# Patient Record
Sex: FEMALE | Race: WHITE | NOT HISPANIC OR LATINO | Employment: OTHER | URBAN - METROPOLITAN AREA
[De-identification: names, ages, dates, MRNs, and addresses within clinical notes are randomized per-mention and may not be internally consistent; named-entity substitution may affect disease eponyms.]

---

## 2018-08-03 ENCOUNTER — APPOINTMENT (OUTPATIENT)
Dept: RADIOLOGY | Facility: MEDICAL CENTER | Age: 83
End: 2018-08-03
Attending: EMERGENCY MEDICINE
Payer: MEDICARE

## 2018-08-03 ENCOUNTER — HOSPITAL ENCOUNTER (OUTPATIENT)
Facility: MEDICAL CENTER | Age: 83
End: 2018-08-04
Attending: EMERGENCY MEDICINE | Admitting: HOSPITALIST
Payer: MEDICARE

## 2018-08-03 DIAGNOSIS — S81.802D WOUND OF LEFT LOWER EXTREMITY, SUBSEQUENT ENCOUNTER: ICD-10-CM

## 2018-08-03 LAB
ABO GROUP BLD: NORMAL
ABO GROUP BLD: NORMAL
ALBUMIN SERPL BCP-MCNC: 3.9 G/DL (ref 3.2–4.9)
ALBUMIN/GLOB SERPL: 1.7 G/DL
ALP SERPL-CCNC: 86 U/L (ref 30–99)
ALT SERPL-CCNC: 19 U/L (ref 2–50)
ANION GAP SERPL CALC-SCNC: 5 MMOL/L (ref 0–11.9)
APPEARANCE UR: CLEAR
APTT PPP: 29.5 SEC (ref 24.7–36)
AST SERPL-CCNC: 27 U/L (ref 12–45)
BASOPHILS # BLD AUTO: 0.5 % (ref 0–1.8)
BASOPHILS # BLD: 0.03 K/UL (ref 0–0.12)
BILIRUB SERPL-MCNC: 0.7 MG/DL (ref 0.1–1.5)
BILIRUB UR QL STRIP.AUTO: NEGATIVE
BLD GP AB SCN SERPL QL: NORMAL
BUN SERPL-MCNC: 16 MG/DL (ref 8–22)
CALCIUM SERPL-MCNC: 8.9 MG/DL (ref 8.5–10.5)
CHLORIDE SERPL-SCNC: 106 MMOL/L (ref 96–112)
CHOLEST SERPL-MCNC: 138 MG/DL (ref 100–199)
CO2 SERPL-SCNC: 24 MMOL/L (ref 20–33)
COLOR UR: YELLOW
CREAT SERPL-MCNC: 0.74 MG/DL (ref 0.5–1.4)
EOSINOPHIL # BLD AUTO: 0.13 K/UL (ref 0–0.51)
EOSINOPHIL NFR BLD: 2 % (ref 0–6.9)
ERYTHROCYTE [DISTWIDTH] IN BLOOD BY AUTOMATED COUNT: 47.8 FL (ref 35.9–50)
GLOBULIN SER CALC-MCNC: 2.3 G/DL (ref 1.9–3.5)
GLUCOSE SERPL-MCNC: 72 MG/DL (ref 65–99)
GLUCOSE UR STRIP.AUTO-MCNC: NEGATIVE MG/DL
HCT VFR BLD AUTO: 40.1 % (ref 37–47)
HDLC SERPL-MCNC: 63 MG/DL
HGB BLD-MCNC: 13.5 G/DL (ref 12–16)
IMM GRANULOCYTES # BLD AUTO: 0.02 K/UL (ref 0–0.11)
IMM GRANULOCYTES NFR BLD AUTO: 0.3 % (ref 0–0.9)
INR PPP: 0.96 (ref 0.87–1.13)
KETONES UR STRIP.AUTO-MCNC: NEGATIVE MG/DL
LDLC SERPL CALC-MCNC: 60 MG/DL
LEUKOCYTE ESTERASE UR QL STRIP.AUTO: NEGATIVE
LYMPHOCYTES # BLD AUTO: 1.08 K/UL (ref 1–4.8)
LYMPHOCYTES NFR BLD: 16.8 % (ref 22–41)
MCH RBC QN AUTO: 29.9 PG (ref 27–33)
MCHC RBC AUTO-ENTMCNC: 33.7 G/DL (ref 33.6–35)
MCV RBC AUTO: 88.7 FL (ref 81.4–97.8)
MICRO URNS: NORMAL
MONOCYTES # BLD AUTO: 0.6 K/UL (ref 0–0.85)
MONOCYTES NFR BLD AUTO: 9.3 % (ref 0–13.4)
NEUTROPHILS # BLD AUTO: 4.57 K/UL (ref 2–7.15)
NEUTROPHILS NFR BLD: 71.1 % (ref 44–72)
NITRITE UR QL STRIP.AUTO: NEGATIVE
NRBC # BLD AUTO: 0 K/UL
NRBC BLD-RTO: 0 /100 WBC
PH UR STRIP.AUTO: 7 [PH]
PLATELET # BLD AUTO: 208 K/UL (ref 164–446)
PMV BLD AUTO: 11.7 FL (ref 9–12.9)
POTASSIUM SERPL-SCNC: 4.1 MMOL/L (ref 3.6–5.5)
PROT SERPL-MCNC: 6.2 G/DL (ref 6–8.2)
PROT UR QL STRIP: NEGATIVE MG/DL
PROTHROMBIN TIME: 12.5 SEC (ref 12–14.6)
RBC # BLD AUTO: 4.52 M/UL (ref 4.2–5.4)
RBC UR QL AUTO: NEGATIVE
RH BLD: NORMAL
RH BLD: NORMAL
SODIUM SERPL-SCNC: 135 MMOL/L (ref 135–145)
SP GR UR STRIP.AUTO: 1.01
TRIGL SERPL-MCNC: 75 MG/DL (ref 0–149)
TROPONIN I SERPL-MCNC: <0.01 NG/ML (ref 0–0.04)
UROBILINOGEN UR STRIP.AUTO-MCNC: 0.2 MG/DL
WBC # BLD AUTO: 6.4 K/UL (ref 4.8–10.8)

## 2018-08-03 PROCEDURE — G0378 HOSPITAL OBSERVATION PER HR: HCPCS

## 2018-08-03 PROCEDURE — 85730 THROMBOPLASTIN TIME PARTIAL: CPT

## 2018-08-03 PROCEDURE — 84484 ASSAY OF TROPONIN QUANT: CPT

## 2018-08-03 PROCEDURE — 86901 BLOOD TYPING SEROLOGIC RH(D): CPT

## 2018-08-03 PROCEDURE — 93005 ELECTROCARDIOGRAM TRACING: CPT | Performed by: EMERGENCY MEDICINE

## 2018-08-03 PROCEDURE — 99285 EMERGENCY DEPT VISIT HI MDM: CPT

## 2018-08-03 PROCEDURE — 86850 RBC ANTIBODY SCREEN: CPT

## 2018-08-03 PROCEDURE — 99220 PR INITIAL OBSERVATION CARE,LEVL III: CPT | Performed by: HOSPITALIST

## 2018-08-03 PROCEDURE — 81003 URINALYSIS AUTO W/O SCOPE: CPT

## 2018-08-03 PROCEDURE — 80061 LIPID PANEL: CPT

## 2018-08-03 PROCEDURE — 70496 CT ANGIOGRAPHY HEAD: CPT

## 2018-08-03 PROCEDURE — 86900 BLOOD TYPING SEROLOGIC ABO: CPT

## 2018-08-03 PROCEDURE — 700117 HCHG RX CONTRAST REV CODE 255: Performed by: EMERGENCY MEDICINE

## 2018-08-03 PROCEDURE — 71045 X-RAY EXAM CHEST 1 VIEW: CPT

## 2018-08-03 PROCEDURE — 700105 HCHG RX REV CODE 258: Performed by: HOSPITALIST

## 2018-08-03 PROCEDURE — 0042T CT-CEREBRAL PERFUSION ANALYSIS: CPT

## 2018-08-03 PROCEDURE — 70450 CT HEAD/BRAIN W/O DYE: CPT

## 2018-08-03 PROCEDURE — 83036 HEMOGLOBIN GLYCOSYLATED A1C: CPT

## 2018-08-03 PROCEDURE — 70498 CT ANGIOGRAPHY NECK: CPT

## 2018-08-03 PROCEDURE — 80053 COMPREHEN METABOLIC PANEL: CPT

## 2018-08-03 PROCEDURE — 85025 COMPLETE CBC W/AUTO DIFF WBC: CPT

## 2018-08-03 PROCEDURE — 85610 PROTHROMBIN TIME: CPT

## 2018-08-03 RX ORDER — CEPHALEXIN 500 MG/1
500 CAPSULE ORAL 3 TIMES DAILY
COMMUNITY
Start: 2018-08-03

## 2018-08-03 RX ORDER — BISACODYL 10 MG
10 SUPPOSITORY, RECTAL RECTAL
Status: DISCONTINUED | OUTPATIENT
Start: 2018-08-03 | End: 2018-08-05 | Stop reason: HOSPADM

## 2018-08-03 RX ORDER — CEPHALEXIN 500 MG/1
500 CAPSULE ORAL 3 TIMES DAILY
Status: DISCONTINUED | OUTPATIENT
Start: 2018-08-04 | End: 2018-08-05 | Stop reason: HOSPADM

## 2018-08-03 RX ORDER — CLOPIDOGREL BISULFATE 75 MG/1
75 TABLET ORAL DAILY
Status: DISCONTINUED | OUTPATIENT
Start: 2018-08-04 | End: 2018-08-05 | Stop reason: HOSPADM

## 2018-08-03 RX ORDER — VITAMIN E 268 MG
400 CAPSULE ORAL DAILY
COMMUNITY

## 2018-08-03 RX ORDER — METOPROLOL SUCCINATE 50 MG/1
75 TABLET, EXTENDED RELEASE ORAL DAILY
COMMUNITY

## 2018-08-03 RX ORDER — LEVOTHYROXINE SODIUM 88 UG/1
88 TABLET ORAL
COMMUNITY

## 2018-08-03 RX ORDER — AMOXICILLIN 250 MG
2 CAPSULE ORAL 2 TIMES DAILY
Status: DISCONTINUED | OUTPATIENT
Start: 2018-08-03 | End: 2018-08-05 | Stop reason: HOSPADM

## 2018-08-03 RX ORDER — POLYETHYLENE GLYCOL 3350 17 G/17G
1 POWDER, FOR SOLUTION ORAL
Status: DISCONTINUED | OUTPATIENT
Start: 2018-08-03 | End: 2018-08-05 | Stop reason: HOSPADM

## 2018-08-03 RX ORDER — LEVOTHYROXINE SODIUM 88 UG/1
88 TABLET ORAL
Status: DISCONTINUED | OUTPATIENT
Start: 2018-08-04 | End: 2018-08-05 | Stop reason: HOSPADM

## 2018-08-03 RX ORDER — SODIUM CHLORIDE 9 MG/ML
1000 INJECTION, SOLUTION INTRAVENOUS ONCE
Status: COMPLETED | OUTPATIENT
Start: 2018-08-03 | End: 2018-08-04

## 2018-08-03 RX ORDER — LOSARTAN POTASSIUM 50 MG/1
50 TABLET ORAL 2 TIMES DAILY
COMMUNITY

## 2018-08-03 RX ORDER — HYDRALAZINE HYDROCHLORIDE 20 MG/ML
10 INJECTION INTRAMUSCULAR; INTRAVENOUS
Status: DISCONTINUED | OUTPATIENT
Start: 2018-08-03 | End: 2018-08-04

## 2018-08-03 RX ORDER — SIMVASTATIN 10 MG
10 TABLET ORAL DAILY
COMMUNITY

## 2018-08-03 RX ORDER — CLOPIDOGREL BISULFATE 75 MG/1
75 TABLET ORAL DAILY
COMMUNITY

## 2018-08-03 RX ORDER — SIMVASTATIN 20 MG
40 TABLET ORAL DAILY
Status: DISCONTINUED | OUTPATIENT
Start: 2018-08-04 | End: 2018-08-04

## 2018-08-03 RX ORDER — LABETALOL HYDROCHLORIDE 5 MG/ML
10 INJECTION, SOLUTION INTRAVENOUS EVERY 4 HOURS PRN
Status: DISCONTINUED | OUTPATIENT
Start: 2018-08-03 | End: 2018-08-04

## 2018-08-03 RX ADMIN — IOHEXOL 120 ML: 350 INJECTION, SOLUTION INTRAVENOUS at 21:48

## 2018-08-03 RX ADMIN — SODIUM CHLORIDE 1000 ML: 9 INJECTION, SOLUTION INTRAVENOUS at 23:10

## 2018-08-03 ASSESSMENT — PAIN SCALES - GENERAL: PAINLEVEL_OUTOF10: 0

## 2018-08-04 ENCOUNTER — APPOINTMENT (OUTPATIENT)
Dept: RADIOLOGY | Facility: MEDICAL CENTER | Age: 83
End: 2018-08-04
Attending: HOSPITALIST
Payer: MEDICARE

## 2018-08-04 VITALS
RESPIRATION RATE: 20 BRPM | HEART RATE: 68 BPM | SYSTOLIC BLOOD PRESSURE: 170 MMHG | DIASTOLIC BLOOD PRESSURE: 79 MMHG | HEIGHT: 60 IN | BODY MASS INDEX: 26.19 KG/M2 | WEIGHT: 133.38 LBS | OXYGEN SATURATION: 97 % | TEMPERATURE: 97.9 F

## 2018-08-04 PROBLEM — R47.01 EXPRESSIVE APHASIA: Status: RESOLVED | Noted: 2018-08-04 | Resolved: 2018-08-04

## 2018-08-04 PROBLEM — R47.01 EXPRESSIVE APHASIA: Status: ACTIVE | Noted: 2018-08-04

## 2018-08-04 PROBLEM — E03.9 HYPOTHYROIDISM: Status: ACTIVE | Noted: 2018-08-04

## 2018-08-04 PROBLEM — G45.9 TIA (TRANSIENT ISCHEMIC ATTACK): Status: ACTIVE | Noted: 2018-08-04

## 2018-08-04 PROBLEM — I10 HTN (HYPERTENSION): Status: ACTIVE | Noted: 2018-08-04

## 2018-08-04 PROBLEM — S81.802A LEG WOUND, LEFT: Status: ACTIVE | Noted: 2018-08-04

## 2018-08-04 PROBLEM — E78.5 HLD (HYPERLIPIDEMIA): Status: ACTIVE | Noted: 2018-08-04

## 2018-08-04 LAB
EST. AVERAGE GLUCOSE BLD GHB EST-MCNC: 111 MG/DL
HBA1C MFR BLD: 5.5 % (ref 0–5.6)
LV EJECT FRACT  99904: 65
LV EJECT FRACT MOD 2C 99903: 60.63
LV EJECT FRACT MOD 4C 99902: 60.62
LV EJECT FRACT MOD BP 99901: 60.65
TSH SERPL DL<=0.005 MIU/L-ACNC: 2.65 UIU/ML (ref 0.38–5.33)

## 2018-08-04 PROCEDURE — 97161 PT EVAL LOW COMPLEX 20 MIN: CPT

## 2018-08-04 PROCEDURE — 97165 OT EVAL LOW COMPLEX 30 MIN: CPT

## 2018-08-04 PROCEDURE — 700102 HCHG RX REV CODE 250 W/ 637 OVERRIDE(OP): Performed by: NURSE PRACTITIONER

## 2018-08-04 PROCEDURE — G8989 SELF CARE D/C STATUS: HCPCS | Mod: CI

## 2018-08-04 PROCEDURE — 700102 HCHG RX REV CODE 250 W/ 637 OVERRIDE(OP): Performed by: INTERNAL MEDICINE

## 2018-08-04 PROCEDURE — 96105 ASSESSMENT OF APHASIA: CPT | Mod: XU

## 2018-08-04 PROCEDURE — G0378 HOSPITAL OBSERVATION PER HR: HCPCS

## 2018-08-04 PROCEDURE — 84443 ASSAY THYROID STIM HORMONE: CPT

## 2018-08-04 PROCEDURE — G9162 LANG EXPRESS CURRENT STATUS: HCPCS | Mod: CI

## 2018-08-04 PROCEDURE — G8988 SELF CARE GOAL STATUS: HCPCS | Mod: CI

## 2018-08-04 PROCEDURE — 93306 TTE W/DOPPLER COMPLETE: CPT

## 2018-08-04 PROCEDURE — 99217 PR OBSERVATION CARE DISCHARGE: CPT | Performed by: HOSPITALIST

## 2018-08-04 PROCEDURE — A9270 NON-COVERED ITEM OR SERVICE: HCPCS | Performed by: NURSE PRACTITIONER

## 2018-08-04 PROCEDURE — 700102 HCHG RX REV CODE 250 W/ 637 OVERRIDE(OP): Performed by: HOSPITALIST

## 2018-08-04 PROCEDURE — G8979 MOBILITY GOAL STATUS: HCPCS | Mod: CI

## 2018-08-04 PROCEDURE — 70551 MRI BRAIN STEM W/O DYE: CPT

## 2018-08-04 PROCEDURE — A9270 NON-COVERED ITEM OR SERVICE: HCPCS | Performed by: INTERNAL MEDICINE

## 2018-08-04 PROCEDURE — G8978 MOBILITY CURRENT STATUS: HCPCS | Mod: CJ

## 2018-08-04 PROCEDURE — A9270 NON-COVERED ITEM OR SERVICE: HCPCS | Performed by: HOSPITALIST

## 2018-08-04 PROCEDURE — G8987 SELF CARE CURRENT STATUS: HCPCS | Mod: CI

## 2018-08-04 PROCEDURE — G9163 LANG EXPRESS GOAL STATUS: HCPCS | Mod: CI

## 2018-08-04 PROCEDURE — G9164 LANG EXPRESS D/C STATUS: HCPCS | Mod: CI

## 2018-08-04 PROCEDURE — 93306 TTE W/DOPPLER COMPLETE: CPT | Mod: 26 | Performed by: INTERNAL MEDICINE

## 2018-08-04 RX ORDER — HYDRALAZINE HYDROCHLORIDE 20 MG/ML
10 INJECTION INTRAMUSCULAR; INTRAVENOUS
Status: DISCONTINUED | OUTPATIENT
Start: 2018-08-04 | End: 2018-08-05 | Stop reason: HOSPADM

## 2018-08-04 RX ORDER — LOPERAMIDE HYDROCHLORIDE 2 MG/1
2 CAPSULE ORAL ONCE
Status: COMPLETED | OUTPATIENT
Start: 2018-08-04 | End: 2018-08-04

## 2018-08-04 RX ORDER — SIMVASTATIN 20 MG
40 TABLET ORAL
Status: DISCONTINUED | OUTPATIENT
Start: 2018-08-04 | End: 2018-08-05 | Stop reason: HOSPADM

## 2018-08-04 RX ORDER — LOSARTAN POTASSIUM 50 MG/1
50 TABLET ORAL 2 TIMES DAILY
Status: DISCONTINUED | OUTPATIENT
Start: 2018-08-04 | End: 2018-08-05 | Stop reason: HOSPADM

## 2018-08-04 RX ORDER — LABETALOL HYDROCHLORIDE 5 MG/ML
10 INJECTION, SOLUTION INTRAVENOUS EVERY 4 HOURS PRN
Status: DISCONTINUED | OUTPATIENT
Start: 2018-08-04 | End: 2018-08-05 | Stop reason: HOSPADM

## 2018-08-04 RX ADMIN — ASPIRIN 81 MG: 81 TABLET, COATED ORAL at 06:01

## 2018-08-04 RX ADMIN — CLOPIDOGREL 75 MG: 75 TABLET, FILM COATED ORAL at 06:01

## 2018-08-04 RX ADMIN — CEPHALEXIN 500 MG: 500 CAPSULE ORAL at 18:33

## 2018-08-04 RX ADMIN — CEPHALEXIN 500 MG: 500 CAPSULE ORAL at 13:10

## 2018-08-04 RX ADMIN — LOPERAMIDE HYDROCHLORIDE 2 MG: 2 CAPSULE ORAL at 22:45

## 2018-08-04 RX ADMIN — CEPHALEXIN 500 MG: 500 CAPSULE ORAL at 06:01

## 2018-08-04 RX ADMIN — LEVOTHYROXINE SODIUM 88 MCG: 88 TABLET ORAL at 06:01

## 2018-08-04 RX ADMIN — METOPROLOL SUCCINATE 75 MG: 25 TABLET, EXTENDED RELEASE ORAL at 18:33

## 2018-08-04 RX ADMIN — LOSARTAN POTASSIUM 50 MG: 50 TABLET ORAL at 18:34

## 2018-08-04 RX ADMIN — SIMVASTATIN 40 MG: 20 TABLET, FILM COATED ORAL at 21:23

## 2018-08-04 ASSESSMENT — COGNITIVE AND FUNCTIONAL STATUS - GENERAL
SUGGESTED CMS G CODE MODIFIER DAILY ACTIVITY: CI
CLIMB 3 TO 5 STEPS WITH RAILING: A LITTLE
SUGGESTED CMS G CODE MODIFIER MOBILITY: CI
HELP NEEDED FOR BATHING: A LITTLE
MOBILITY SCORE: 23
DAILY ACTIVITIY SCORE: 23

## 2018-08-04 ASSESSMENT — ENCOUNTER SYMPTOMS
MYALGIAS: 0
TINGLING: 0
SPEECH CHANGE: 1
ABDOMINAL PAIN: 0
FEVER: 0
HEADACHES: 0
FOCAL WEAKNESS: 0
SORE THROAT: 0
NAUSEA: 0
DIZZINESS: 0
VOMITING: 0
DIARRHEA: 0
CHILLS: 0
PHOTOPHOBIA: 0
PALPITATIONS: 0
WHEEZING: 0
SHORTNESS OF BREATH: 0
DEPRESSION: 0
COUGH: 0

## 2018-08-04 ASSESSMENT — GAIT ASSESSMENTS
GAIT LEVEL OF ASSIST: SUPERVISED
DISTANCE (FEET): 85
DEVIATION: ANTALGIC;STEP TO;INCREASED BASE OF SUPPORT;DECREASED HEEL STRIKE;DECREASED TOE OFF
ASSISTIVE DEVICE: FRONT WHEEL WALKER

## 2018-08-04 ASSESSMENT — LIFESTYLE VARIABLES
EVER_SMOKED: NEVER
ALCOHOL_USE: NO

## 2018-08-04 ASSESSMENT — PATIENT HEALTH QUESTIONNAIRE - PHQ9
1. LITTLE INTEREST OR PLEASURE IN DOING THINGS: NOT AT ALL
SUM OF ALL RESPONSES TO PHQ9 QUESTIONS 1 AND 2: 0
2. FEELING DOWN, DEPRESSED, IRRITABLE, OR HOPELESS: NOT AT ALL

## 2018-08-04 ASSESSMENT — PAIN SCALES - GENERAL
PAINLEVEL_OUTOF10: 4
PAINLEVEL_OUTOF10: 0
PAINLEVEL_OUTOF10: 4

## 2018-08-04 ASSESSMENT — ACTIVITIES OF DAILY LIVING (ADL): TOILETING: INDEPENDENT

## 2018-08-04 NOTE — DISCHARGE SUMMARY
Discharge Summary    CHIEF COMPLAINT ON ADMISSION  No chief complaint on file.      Reason for Admission  Stroke     Admission Date  8/3/2018    CODE STATUS  Full Code    HPI & HOSPITAL COURSE  This is a 86 y.o. female here with TIA. Please see the previously dictated history of present illness 8/3/2018 for complete details. In short, the patient is from [facility] [place] when she noticed some worsening redness around her leg wound as well as slurred speech with mild expressive aphasia. The patient reports history of TIA in 2012 and has since been on aspirin and Plavix as well as a statin. She thus presented to the emergency room for further evaluation and treatment.    Initial EKG was unremarkable for acute pathology. Initial CT scan of the brain without contrast was negative for intracranial hemorrhage or mass. CTA head and neck was negative for aneurysm or large vessel occlusion. She was subsequently admitted to the observation unit for MRI and echocardiogram. MRI showed a single tiny punctate focus of T2 hyperintensity in the left paramedian cheryl which could represent a tiny old lacunar infarct. However there was no evidence of acute infarction in the brain stem, cerebellum or cerebral cortex. Echocardiogram revealed preserved ejection fraction at approximately 65% with no significant valvular or wall motion abnormalities.    Further evaluation of the left lower extremity leg wound shows slight basilio-laceration erythema consistent with inflammation but no active cellulitis or purulent drainage. Wound care was consulted and provided some dressing supplies. Patient was evaluated by PT and OT who felt she would benefit from continued treatment while an inpatient. However the patient appears well enough to be discharged and participate with an outpatient therapy program. She will be provided with a front wheeled walker per therapy recommendations to assist with painful  mobility.    The patient was counseled regarding the results of her tests and informed that she is clear for discharge. However the patient and her family representative expressed a desire to stay as they feel too exhausted and emotionally overwhelmed to drive back to the [facility] or find a hotel room. They were informed that staying an additional night in the hospital without being medical criteria could lead to responsibility for the bill. They expressed understanding of this liability but still would like to stay.    Therefore, she is discharged in good and stable condition to home with close outpatient follow-up.    Discharge Date  8/4/2018    FOLLOW UP ITEMS POST DISCHARGE  None    DISCHARGE DIAGNOSES  Principal Problem:    Expressive aphasia POA: Unknown  Active Problems:    HTN (hypertension) POA: Unknown    HLD (hyperlipidemia) POA: Unknown    Hypothyroidism POA: Unknown    Leg wound, left POA: Unknown  Resolved Problems:    * No resolved hospital problems. *      FOLLOW UP  No future appointments.  No follow-up provider specified.    MEDICATIONS ON DISCHARGE     Medication List      ASK your doctor about these medications      Instructions   acetaminophen-codeine #3 300-30 MG Tabs  Commonly known as:  TYLENOL #3   Take 1 Tab by mouth every four hours as needed.  Dose:  1 Tab     aspirin 81 MG tablet   Take 81 mg by mouth every day.  Dose:  81 mg     cephALEXin 500 MG Caps  Commonly known as:  KEFLEX   Take 500 mg by mouth 3 times a day. 7 day course, started 8/3/18  Dose:  500 mg     clopidogrel 75 MG Tabs  Commonly known as:  PLAVIX   Take 75 mg by mouth every day.  Dose:  75 mg     levothyroxine 88 MCG Tabs  Commonly known as:  SYNTHROID   Take 88 mcg by mouth Every morning on an empty stomach.  Dose:  88 mcg     losartan 50 MG Tabs  Commonly known as:  COZAAR   Take 50 mg by mouth 2 Times a Day.  Dose:  50 mg     metoprolol SR 50 MG Tb24  Commonly known as:  TOPROL XL   Take 75 mg by mouth every  day.  Dose:  75 mg     simvastatin 10 MG Tabs  Commonly known as:  ZOCOR   Take 10 mg by mouth every day.  Dose:  10 mg     vitamin e 400 UNIT Caps  Commonly known as:  VITAMIN E   Take 400 Units by mouth every day.  Dose:  400 Units            Allergies  No Known Allergies    DIET  Orders Placed This Encounter   Procedures   • Diet Order Regular     Standing Status:   Standing     Number of Occurrences:   1     Order Specific Question:   Diet:     Answer:   Regular [1]       ACTIVITY  As tolerated.  Weight bearing as tolerated    CONSULTATIONS  None    PROCEDURES  None    LABORATORY  Lab Results   Component Value Date    SODIUM 135 08/03/2018    POTASSIUM 4.1 08/03/2018    CHLORIDE 106 08/03/2018    CO2 24 08/03/2018    GLUCOSE 72 08/03/2018    BUN 16 08/03/2018    CREATININE 0.74 08/03/2018        Lab Results   Component Value Date    WBC 6.4 08/03/2018    HEMOGLOBIN 13.5 08/03/2018    HEMATOCRIT 40.1 08/03/2018    PLATELETCT 208 08/03/2018        Total time of the discharge process exceeds 40 minutes.

## 2018-08-04 NOTE — THERAPY
"Physical Therapy Evaluation completed.   Bed Mobility:  Supine to Sit: Supervised  Transfers: Sit to Stand: Supervised  Gait: Level Of Assist: Supervised with Front-Wheel Walker       Plan of Care: Will benefit from Physical Therapy 3 times per week  Discharge Recommendations: Equipment: Front-Wheel Walker.     See \"Rehab Therapy-Acute\" Patient Summary Report for complete documentation.     Pt presented to PT for primary risk reduction for LOB/falling and for possible TIA workup. Pt presented with imparied balance, impaired gait, pain in LLE, and dec activity tolerance. Pt was able to demonstrate SPV for all functional mobility at this time w/FWW use and was primarily limited in distance and tolerance to mobility secondary to LLE pain. Pt educated on performing stair evalution with therapist for safe d/c home, however, pt declined to participate in stair evaluation secondary to LLE pain, and reports she feels safe to climb up/down steps with family support. Pt educated on climb up/down steps with good leg going up and down with the bad leg with family support. Pt will benefit from continued skilled PT while in house for stair training, however, pt appears to be able to d/c home with 24/7 SPV family support at this time due to limited mobility secondary to pain. Will recommend use of FWW at this time to help with painful mobility.   "

## 2018-08-04 NOTE — H&P
Hospital Medicine History and Physical    Date of Service  8/3/2018    Chief Complaint  Difficulty with speech and blurred right vision    History of Presenting Illness  86 y.o. female who presented on 8/3/2018 with expressive aphasia and blurred right vision.  The patient is visiting town from [facility] for a wedding.  Several days ago, she hit her left leg on a box and suffered a severe laceration.  She was seen by physicians in [facility] and had staples placed and was sent home on antibiotic therapy.  She drove from [facility] to [facility] on Tuesday.  Yesterday, she noted that she had some worsening redness of her leg wound therefore she was seen at an outside facility where she had been recommended to elevate her leg and had been discharged home.  Patient had been elevating her leg at home as recommended when today, she became more tired than usual and per family was confused and disoriented.  She also reports a blurring of her right eye and family members also noted some slurred speech with mild expressive aphasia.  Patient's history is positive for TIA in 2012 and since then she has been on aspirin and Plavix as well as a statin.  She otherwise has been in her usual state of health with no fevers, chills, nausea, vomiting, headache, chest pain, shortness of breath and denies any focal weaknesses.  She does report some loose stools since she started taking her antibiotic.      Primary Care Physician  No primary care provider on file.    Consultants  None    Code Status  Full    Review of Systems  Review of Systems   Constitutional: Negative for chills and fever.   HENT: Negative for congestion and sore throat.         Right blurred vision   Eyes: Negative for photophobia.   Respiratory: Negative for cough, shortness of breath and wheezing.    Cardiovascular: Negative for chest pain and palpitations.   Gastrointestinal: Negative for abdominal pain, diarrhea, nausea and vomiting.    Genitourinary: Negative for dysuria.   Musculoskeletal: Negative for myalgias.   Skin: Negative.    Neurological: Positive for speech change. Negative for dizziness, tingling, focal weakness and headaches.   Psychiatric/Behavioral: Negative for depression and suicidal ideas.        Past Medical History  Past Medical History:   Diagnosis Date   • TIA (transient ischemic attack)    • Hypertension        Surgical History  Cataract surgery    Medications  No current facility-administered medications on file prior to encounter.      No current outpatient prescriptions on file prior to encounter.       Family History  Brother with coronary artery disease    Social History  Social History   Substance Use Topics   • Smoking status: Never Smoker   • Smokeless tobacco: Never Used   • Alcohol use Yes      Comment: 1-2 drinks/week       Allergies  Allergies not on file     Physical Exam  Laboratory   Hemodynamics  Temp (24hrs), Av.1 °C (98.8 °F), Min:37.1 °C (98.8 °F), Max:37.1 °C (98.8 °F)   Temperature: 37.1 °C (98.8 °F)  Pulse  Av  Min: 72  Max: 74    Blood Pressure : 146/79, NIBP: 153/74      Respiratory      Respiration: 16, Pulse Oximetry: 95 %             Physical Exam   Constitutional: She is oriented to person, place, and time. No distress.   HENT:   Head: Normocephalic and atraumatic.   Right Ear: External ear normal.   Left Ear: External ear normal.   Eyes: EOM are normal. Right eye exhibits no discharge. Left eye exhibits no discharge.   Neck: Neck supple. No JVD present.   Cardiovascular: Normal rate, regular rhythm and normal heart sounds.    Pulmonary/Chest: Effort normal and breath sounds normal. No respiratory distress. She exhibits no tenderness.   Abdominal: Soft. Bowel sounds are normal. She exhibits no distension. There is no tenderness.   Musculoskeletal: Normal range of motion. She exhibits no edema.   Neurological: She is alert and oriented to person, place, and time. No cranial nerve  deficit.   Skin: Skin is warm and dry. She is not diaphoretic. No erythema.   Patient has a large wound on her left lateral shin, no active bleeding noted but wound is moist.   Psychiatric: She has a normal mood and affect. Her behavior is normal.   Nursing note and vitals reviewed.    Capillary refill less than 3 seconds, distal pulses intact    Recent Labs      08/03/18 2128   WBC  6.4   RBC  4.52   HEMOGLOBIN  13.5   HEMATOCRIT  40.1   MCV  88.7   MCH  29.9   MCHC  33.7   RDW  47.8   PLATELETCT  208   MPV  11.7         No results for input(s): ALTSGPT, ASTSGOT, ALKPHOSPHAT, TBILIRUBIN, DBILIRUBIN, GAMMAGT, AMYLASE, LIPASE, ALB, PREALBUMIN, GLUCOSE in the last 72 hours.  Recent Labs      08/03/18 2128   APTT  29.5   INR  0.96             No results found for: TROPONINI    Imaging  Ct-cta Head With & W/o-post Process    Result Date: 8/3/2018  8/3/2018 9:33 PM HISTORY/REASON FOR EXAM:  Neurological Deficit TECHNIQUE/EXAM DESCRIPTION: CT angiogram of the [place] without and with contrast.  Initial precontrast images were obtained of the head from the skull base through the vertex.  Postcontrast images were obtained of the [place] following the power injection of nonionic contrast at 5.0 mL/sec. Thin-section helical images were obtained with overlapping reconstruction interval. Coronal and sagittal multiplanar volume reformats were generated.  3D angiographic images were reviewed on PACS.  Maximum intensity projection (MIP) images were generated and reviewed. Omnipaque 350 nonionic contrast was injected intravenously. Low dose optimization technique was utilized for this CT exam including automated exposure control and adjustment of the mA and/or kV according to patient size. COMPARISON:  None. FINDINGS: The posterior circulation shows the distal vertebral arteries to be patent. Left vertebral dominant system. The vertebrobasilar confluence is intact. The basilar artery is patent. No aneurysm or  occlusive lesion is evident. The anterior circulation shows no stenotic or occlusive lesion. No aneurysm is evident about the [place]. Chronic brain findings are discussed in detail in a separate CT head report.     CT angiogram of the [place] is within normal limits. No evidence of aneurysm or large vessel occlusion.    Ct-cta Neck With & W/o-post Processing    Result Date: 8/3/2018  8/3/2018 9:33 PM HISTORY/REASON FOR EXAM:  Neurological Deficit TECHNIQUE/EXAM DESCRIPTION: CT angiogram of the neck with contrast. Postcontrast images were obtained of the neck from the great vessels through the skull base following the power injection of nonionic contrast at 5.0 mL/sec. Thin-section helical images were obtained with overlapping reconstruction interval. Coronal and oblique multiplanar volume reformats were generated. Cervical internal carotid artery percent stenosis is calculated using the standard method according to the NASCET criteria wherein a segment of uniform caliber mid or distal cervical internal carotid is used as the reference denominator. 3D angiographic images were reviewed on PACS.  Maximum intensity projection (MIP) images were generated and reviewed Omnipaque 350 nonionic contrast was injected intravenously. Low dose optimization technique was utilized for this CT exam including automated exposure control and adjustment of the mA and/or kV according to patient size. COMPARISON:  None. FINDINGS: The arch origins of the great vessels appear intact. The aortic arch shows no abnormality. The right common carotid artery, cervical carotid bifurcation, and cervical internal carotid artery are within normal limits. Retrotracheal, paraesophageal course of the right common carotid artery. There is no evidence of significant stenosis, thrombus,  or other filling defect or ulceration. There is no evidence of dissection or aneurysm. The left common carotid artery, cervical carotid bifurcation, and  cervical internal carotid artery are within normal limits. There is no evidence of significant stenosis, thrombus, or other filling defect or ulceration. There is no evidence of dissection  or aneurysm. The cervical vertebral arteries are normal bilaterally. The neck soft tissues and lung apices in the field of view are unremarkable. Multifocal cervical spondylosis.     Unremarkable CT angiogram of the neck. No aneurysm, dissection or high-grade stenosis.    Ct-head W/o    Result Date: 8/3/2018   CT HEAD WITHOUT CONTRAST 8/3/2018 9:33 PM HISTORY/REASON FOR EXAM:  Stroke protocol. TECHNIQUE/EXAM DESCRIPTION AND NUMBER OF VIEWS: CT of the head without contrast. The study was performed on a helical multidetector CT scanner. Contiguous 2.5 mm axial sections were obtained from the skull base through the vertex. Up to date radiation dose reduction adjustments have been utilized to meet ALARA standards for radiation dose reduction. COMPARISON:  None available FINDINGS: Lateral ventricles are normal in size and symmetric. Mild global parenchymal atrophy without focality. Multifocal areas of deep, ventricular and subcortical white matter hypoattenuation, consistent with chronic small vessel ischemic changes. Old lacunar infarcts in the basal ganglia. No significant mass effect or midline shift. Basal cisterns are patent. No evidence for intracranial hemorrhage. Calvaria are intact. Visualized orbits are unremarkable. Visualized mastoid air cells are clear. Visualized paranasal sinuses are unremarkable. Atherosclerosis.     1. No CT evidence of acute infarct, hemorrhage or mass. 2. Age-related global parenchymal atrophy and chronic small vessel ischemic changes.    Ct-cerebral Perfusion Analysis    Result Date: 8/3/2018  8/3/2018 9:33 PM HISTORY/REASON FOR EXAM:  Neurological Defect. TECHNIQUE/EXAM DESCRIPTION AND NUMBER OF VIEWS: CT Cerebral Perfusion Analysis. The study was performed on a 128 slice [employer]  Multidetector CT scanner. Perfusion data and corresponding time-activity curves are processed and displayed as color-coded maps in the axial plane for Cerebral Blood Flow (CBF), Cerebral Blood Volume  (CBV), T Max and Mean Transit Time (MTT) and are post processed on the Ischemia view-RAPID virtual . 40 mL of Omnipaque 350 nonionic contrast was injected intravenously. Low dose optimization technique was utilized for this CT exam including automated exposure control and adjustment of the mA and/or kV according to patient size. COMPARISON:  None. FINDINGS: CT perfusion examination over the limited sections of brain reveal that 0 mL of brain parenchyma has less than 30% of cerebral blood flow (CBF < 30%).     1.  CT perfusion examination over the limited section of brain reveals 0 mL of brain parenchyma has less than 30% of cerebral blood flow (CBF). 2.  Please note that the cerebral perfusion was performed on the limited brain tissue around the basal ganglia region. Infarct/ischemia outside the CT perfusion sections can be missed in this study.     Assessment/Plan     Anticipate that patient will need less than 2 midnights for management of the discussed medical issues.    * Expressive aphasia   Assessment & Plan    This is mild, patient has minimal word finding difficulties when examined at bedside.  She does however continue to report right vision blurring.  CT of the head is negative, CTA of the head and neck are both negative for retrievable clot.  She carries a known history of previous TIA and has been on aspirin and Plavix.  She will be admitted to the neurology floor with every 4 hour neurology checks.  Will monitor her on telemetry for any evidence of cardiac dysrhythmias.  I will check an MRI and an echocardiogram.  I will continue her home Plavix, aspirin, and her home statin but I will increase its dosage.  We will allow for permissive hypertension overnight and I have requested PT, OT, and a speech  cognitive evaluation.        Leg wound, left   Assessment & Plan    This is not new, patient sustained this wound several days ago in [facility].  She is status post Steri-Strips with stapling.  She has had some bleeding at home but no active bleeding noted at bedside.  I will continue her on her home antibiotic therapy and request wound care.        Hypothyroidism   Assessment & Plan    Continue home Synthroid.        HLD (hyperlipidemia)   Assessment & Plan    Continue home Zocor but dose has been increased has been increased in light of her persistent neurologic deficits despite home Plavix and aspirin.        HTN (hypertension)   Assessment & Plan    Holding home antihypertensives overnight to allow for permissive hypertension, plan to resume in the morning if all findings are negative.          Prophylaxis: Sequential compression devices for DVT prophylaxis, no PPI indicated, bowel protocol as needed

## 2018-08-04 NOTE — WOUND TEAM
[facility]  Inpatient Services  Initial Wound and Skin Care Evaluation    Admission Date:  8/3/2018   HPI, PMH, SH: Reviewed  Unit where seen by Wound Team: [id number]    WOUND CONSULT RELATED TO:  Left leg wound     SUBJECTIVE:  Pt agreeable      Self Report / Pain Level:  0/10 with treatment of left leg wound       OBJECTIVE:  Pt in bed, family member at bedside    WOUND TYPE, LOCATION, CHARACTERISTICS (Pressure ulcers: location, stage, POA or date identified)    Location and type of wound:left leg trauma         Periwound:    erythema      Drainage:     none      Tissue Type and %:    100% purple covered with thin layer of epithelial, able to palpate a firm base close to the surface of the skin    Wound Edges:   Staples and steri strips  Odor:     none  Exposed structure(s):   none  S&S of Infection:   erythema      Measurements: Taken 8/2/18  Length:    10.0 cm  Width:     5.0 cm   Depth:     ua  Tracts/Undermining:  none      Lab Values:    WBC:       WBC   Date/Time Value Ref Range Status   08/03/2018 09:28 PM 6.4 4.8 - 10.8 K/uL Final     AIC:      Lab Results   Component Value Date/Time    HBA1C 5.5 08/03/2018 09:28 PM         INTERVENTIONS BY WOUND TEAM: Dressing removed, pictures and measurement taken, placed mepitel non-stick, mepilex lite, ABD and tubi  F    Dressing selection:  Non stick, foam         Interdisciplinary consultation:  RN, patient, patient's daughter     EVALUATION:  Pt with tissue tear that was approximated at [facility].  Pt seen at [facility] for new onset of aphasia.  Left leg wound is the result of some trapped blood under a layer of epithelial.  This does not appear to be a deep hematoma.  Keeping blister intact and light compression should assist with fluid elimination under blister allowing area to progress to dry blister and ultimately blister roof sloughing.  Daughter and pt instructed on signs of a worsening condition of the left leg and to seek medical  attention.  Pt given supplies and instructions for dressing changes.      Factors affecting wound healing:  Frail, age  Goals:  Steady decrease in wound area and depth weekly     NURSING PLAN OF CARE ORDERS (X):    Dressing changes: See Dressing Care orders:   X  Skin care: See Skin Care orders:   Rectal tube care: See Rectal Tube Care orders:   Other orders:    RSKIN: CURRENT (X) ORDERED (O)  Q shift Eran:  X  Q shift pressure point assessments:  X  Pressure redistribution mattress        Waffle overlay  ALBER      Bariatric ALBER      Bariatric foam        Heel float boots       Heels floated on pillows      Barrier wipes      Barrier Cream      Barrier paste      Sacral silicone dressing      Silicone O2 tubing      Anchorfast      Trach with Optifoam split foam       Waffle cushion      Rectal tube or BMS      Antifungal tx    Turn q 2 hours     Up to chair     Ambulate   PT/OT     Dietician      PO     TF   TPN   NPO   # days   Other       WOUND TEAM PLAN OF CARE (X):   NPWT change 3 x week:        Dressing changes by wound team:       Follow up as needed:   X    Other (explain):    Anticipated discharge plans (X):  SNF:           Home Care:           Outpatient Wound Center:     X - anticipate follow up wound care will be necessary upon discharge.         Self Care:            Other:

## 2018-08-04 NOTE — ASSESSMENT & PLAN NOTE
This is not new, patient sustained this wound several days ago in [facility].  She is status post Steri-Strips with stapling.  She has had some bleeding at home but no active bleeding noted at bedside.  I will continue her on her home antibiotic therapy and request wound care.

## 2018-08-04 NOTE — PROGRESS NOTES
Pt's family wants wound care,wound opened ,informed MD&wound seen by MD,dressing changed,message left for wound care.

## 2018-08-04 NOTE — ASSESSMENT & PLAN NOTE
Continue home Zocor but dose has been increased has been increased in light of her persistent neurologic deficits despite home Plavix and aspirin.

## 2018-08-04 NOTE — PROGRESS NOTES
Seen pt, AOx 4. On cardiac monitor with SR. Fluids on NS at 75 ml/hr. Expressive aphasia is barely noticeable, pretty much is mixing words, pt stated not normal for her, otherwise neuro is intact. Noted a wound on her Lt leg/shin, no drainage seen. Pt/ffamily and ER nurse just redressed, pt refused to get change. Per report, it has staples and steri strips.Plan of care discussed includes Safety, labs, cardiac monitoring, TIA/Stroke work up ECHO, MRI, PT/OT/SLP and pt understands.

## 2018-08-04 NOTE — ASSESSMENT & PLAN NOTE
This is mild, patient has minimal word finding difficulties when examined at bedside.  She does however continue to report right vision blurring.  CT of the head is negative, CTA of the head and neck are both negative for retrievable clot.  She carries a known history of previous TIA and has been on aspirin and Plavix.  She will be admitted to the neurology floor with every 4 hour neurology checks.  Will monitor her on telemetry for any evidence of cardiac dysrhythmias.  I will check an MRI and an echocardiogram.  I will continue her home Plavix, aspirin, and her home statin but I will increase its dosage.  We will allow for permissive hypertension overnight and I have requested PT, OT, and a speech cognitive evaluation.

## 2018-08-04 NOTE — ED NOTES
Med rec complete per pt's medications brought in by family  Medications returned to family  Per family, pt took all of her morning medications  Pt started a 7 day course of Keflex today

## 2018-08-04 NOTE — ASSESSMENT & PLAN NOTE
Holding home antihypertensives overnight to allow for permissive hypertension, plan to resume in the morning if all findings are negative.

## 2018-08-04 NOTE — ED NOTES
Pt BIB by Theronabel Doug from home, Son witnessed slurred speech and expressive aphasia onset aprox 2030, this lasted for 20-30 mins pt 911 call. Fire arrived aprox 2115 pt A&Ox4, GCS15, -slur speech, with +slow to speak. No other neuro deficits, no motor deficits, full ROM in all extremities, no facial droop. Pt remains neuro stable, no changes pre/post CT.   Pt takes Plavix, compliant with home meds per pt and son.   PTA: LLE wound with staples in place, dry clean wrap in place, wound not assessed by this RN

## 2018-08-04 NOTE — THERAPY
"Occupational Therapy Evaluation completed.   Functional Status:  Pt presents to skilled OT services following speech and blurred R vision, MRI negative for acute infarct, currently no c/o vision deficits, very mild word finding difficulty, mostly noted intermittently requiring increased time to produce speech. Pt was able to perform basic self care tasks, functional mobility and t/f's with supervision using FWW to offload some of the weight due to LLE wound. Pt verbalizing concern about d/c home today w/o assist from family(as they are attending wedding), and will need to manage narrow stairs to get to the hotel she's staying at. No further acute OT services warranted at this time.   Plan of Care: Patient with no further skilled OT needs in the acute care setting at this time  Discharge Recommendations:  Equipment: Front-Wheel Walker. Post-acute therapy Discharge to home with outpatient or home health for additional skilled therapy services. OP for speech deficits if they persist over next few days.     See \"Rehab Therapy-Acute\" Patient Summary Report for complete documentation.    "

## 2018-08-04 NOTE — ED PROVIDER NOTES
"ED Provider Note    Scribed for Emmanuelle Beatty M.D. by Nliay Dumont. 8/3/2018, 9:24 PM.    Primary care provider: No primary care provider on file.  Means of arrival: Ambulance  History obtained from: Patient  History limited by: None    CHIEF COMPLAINT  dysarthria    HPI  Megan Meeks is a 86 y.o. female who presents to the Emergency Department for evaluation of a possible stroke which was first noticed at 8:30 pm. Per EMS, the patient has a history of a TIA in 2012 and tonight she was at home when her  began to notice that her speech was becoming slower. She was not slurring her speech but she does note that she was having difficulty putting words together. Patient also endorses associated right eye blurriness since the onset of her symptoms. The patient recently cut her left leg on a cardboard box and was placed on antibiotics for this. She was told earlier today to keep her left leg elevated and after doing this she began experiencing her symptoms.     REVIEW OF SYSTEMS  Positives as above. Pertinent negatives include slurred speech   All other review of systems are negative    C.     PAST MEDICAL HISTORY   has a past medical history of Hypertension and TIA (transient ischemic attack) (2012).    SURGICAL HISTORY  patient denies any surgical history    SOCIAL HISTORY  Social History   Substance Use Topics   • Smoking status: Never Smoker   • Smokeless tobacco: Never Used   • Alcohol use Yes      Comment: 1-2 drinks/week      History   Drug Use No       FAMILY HISTORY  History reviewed. No pertinent family history.    CURRENT MEDICATIONS  Reviewed. See Encounter Summary.     ALLERGIES  Allergies not on file    PHYSICAL EXAM  VITAL SIGNS: /79   Pulse 74   Temp 37.1 °C (98.8 °F)   Resp 16   Ht 1.511 m (4' 11.5\")   Wt 59.9 kg (132 lb)   SpO2 95%   BMI 26.21 kg/m²    Pulse ox interpretation: I interpret this pulse ox as normal.  Constitutional: Alert in no apparent distress.  HENT: " Normocephalic atraumatic, MMM  Eyes: PERR, Conjunctiva normal, Non-icteric.   Neck: Normal range of motion, No tenderness, Supple, No stridor.   Lymphatic: No lymphadenopathy noted.   Cardiovascular: Regular rate and rhythm, no murmurs.   Thorax & Lungs: Normal breath sounds, No respiratory distress, No wheezing, No chest tenderness.   Abdomen: Bowel sounds normal, Soft, No tenderness, No pulsatile masses. No peritoneal signs.  Skin: Warm, Dry, No erythema, No rash.   Back: No bony tenderness, No CVA tenderness.   Extremities: Intact distal pulses, No edema, No tenderness, No cyanosis  Musculoskeletal: Good range of motion in all major joints. No tenderness to palpation or major deformities noted.   Neurologic: Symmetric smile, eyes shut tight bilaterally, forehead wrinkles bilaterally, sensation intact to light touch bilateral face, tongue midline, head turn and shoulder shrug with full strength. Hearing intact grossly bilaterally. 5/5  strength bilaterally, 5/5 tricep and bicep strength bilaterally. Sensation intact to light touch r, m, u, axillary nerves bilaterally. 5/5 strength quadricep, plantarflexion/dorsiflexion/extensor hallicus longus bilaterally. Sensation intact to light touch bilateral lower extremities in all nerve distributions. No clonus at ankle or elbow. Slowness of speech, no slurring noted.     DIFFERENTIAL DIAGNOSIS AND WORK UP PLAN    10:15 PM Patient seen and examined at bedside. The patient presents with stroke and the differential diagnosis includes but is not limited to TIA, CVA. Ordered DX-chest 2 view, CT-head w/o, CT-cerebral perfusion analysis, CT-CTA head with and without post process, CT-CTA neck with and without post processing, CBC with differential, PTT, APTT, COD, urinalysis, ABO and RH confirmation, CMP, Troponin, estimated GFR,  to evaluate. Patient will be treated with 350 mg/ml Omnipaque for her symptoms.     DIAGNOSTIC STUDIES / PROCEDURES     LABS  CBC CMP urinalysis  coags troponin all within normal limits  All labs were reviewed by me.    EKG  12- Lead EKG; interpreted by myself - Rogerio  Normal sinus rhythm with a rate of 65 bpm.   Normal axis.  Normal intervals.   No ST elevation or depression, or abnormal T wave inversion   No widening of QRS complex   Good R wave progression   Baseline wander in lateral leads  No diagnostic Q waves.   Unchanged from prior EKG   Clinical Impression: Sinus with baseline wander in lateral leads         RADIOLOGY  CT-CTA NECK WITH & W/O-POST PROCESSING   Final Result      Unremarkable CT angiogram of the neck. No aneurysm, dissection or high-grade stenosis.      CT-CTA HEAD WITH & W/O-POST PROCESS   Final Result      CT angiogram of the [place] is within normal limits. No evidence of aneurysm or large vessel occlusion.      CT-CEREBRAL PERFUSION ANALYSIS   Final Result      1.  CT perfusion examination over the limited section of brain reveals 0 mL of brain parenchyma has less than 30% of cerebral blood flow (CBF).      2.  Please note that the cerebral perfusion was performed on the limited brain tissue around the basal ganglia region. Infarct/ischemia outside the CT perfusion sections can be missed in this study.      CT-HEAD W/O   Final Result         1. No CT evidence of acute infarct, hemorrhage or mass.   2. Age-related global parenchymal atrophy and chronic small vessel ischemic changes.      DX-CHEST-PORTABLE (1 VIEW)    (Results Pending)     The radiologist's interpretation of all radiological studies have been reviewed by me.    COURSE & MEDICAL DECISION MAKING  Pertinent Labs & Imaging studies reviewed. (See chart for details)    9:28 PM - Triage to do perforation and CT scan to rule out stroke that was less than 4.5 hours     9:40PM - Paged neurology.     9:47 PM I discussed the patient's case and the above findings with Neurology who agrees to admit for MRI.  At this time the patient is very low NIH and questionable onset  "though family did state 23rd he they also stated later been going on for several hours and very nonfocal neurologic examination patient will be not meet criteria for alteplase at this time    10:07 PM I discussed the patient's case and the above findings with Dr. Tobar (hospitalist) who agrees to admit.     10:20 PM - Patient was reevaluated at bedside. Discussed lab and radiology results with the patient and informed them that she will be admitted to the hospitalist for further care. Patient still has mildly slow speech. She explains that she cut her leg on a cardboard box a few days ago and after taking off the wrapping she has mild erythema and warmth, slight cellulitis.      BP (!) 171/76   Pulse 66   Temp 37.1 °C (98.7 °F)   Resp 16   Ht 1.511 m (4' 11.5\")   Wt 60.5 kg (133 lb 6.1 oz)   SpO2 98%   Breastfeeding? No   BMI 26.49 kg/m²       DISPOSITION:  Patient will be admitted to Dr. Tobar in guarded condition.    FINAL IMPRESSION  1. Altered mental status  2. Left lower leg cellulitis        Nilay DC (Scribe), am scribing for, and in the presence of, Emmanuelle Beatty M.D..    Electronically signed by: Nilay Dumont (Scribe), 8/3/2018    IEmmanuelle M.D. personally performed the services described in this documentation, as scribed by Nilay Dumont in my presence, and it is both accurate and complete.    The note accurately reflects work and decisions made by me.  Emmanuelle Beatty  8/4/2018  5:18 AM    This dictation has been created using voice recognition software and/or scribes. The accuracy of the dictation is limited by the abilities of the software and the expertise of the scribes. I expect there may be some errors of grammar and possibly content. I made every attempt to manually correct the errors within my dictation. However, errors related to voice recognition software and/or scribes may still exist and should be interpreted within the appropriate context.    "

## 2018-08-04 NOTE — THERAPY
Speech Language Therapy Evaluation completed to address speech and communication  Functional Status:  Patient seen this date for aphasia evaluation. Patient awake, alert, oriented in all spheres, pleasant, and cooperative during evaluation. Patient's daughter-in-law's sister present at bedside and appeared supportive. Patient was administered portions of the Bedside WAB and cognitive-linguistic screen. Patient's expressive language was characterized as fluent with very few hesitations/halting, and intermittent paraphasias that were self-corrected by patient, as she is very much aware of her minimal deficits. Patient had slight impreciseness of /s/ phoneme, but otherwise, speech appeared WNL. Patient presented WNL across all domains tested, such as: spontaneous verbal output, verbal comprehension, following complex sequential commands, repetition of words, phrases, and sentences, generative naming, confrontational naming, short-term memory, reading, writing, clock drawing, and oral and verbal apraxia tasks. Patient had minimal deficits in reading comprehension of a short paragraph, but reported several times that this was baseline for her. No other deficits were noted. At this time, patient does not require acute SLP services for minimal expressive aphasia, as language is very functional and patient reports it has already improved significantly compared to yesterday. Patient and family member given handout on aphasia and educated extensively regarding aphasia, speech, language, TIA/CVA, SLP recs, and ability to obtain outpatient SLP services if deficits persist or worsen. Both verbalized understanding of education. RN aware. Please re-consult SLP if needed. Thank you for the consult.     Recommendations:  1.) At this time, patient does not require acute SLP services for minimal expressive aphasia (characterized only by slight halting/hesitation during conversation and intermittent paraphasias), as language is very  "functional and patient reports it has already improved significantly compared to yesterday. 2.) Consider referral for outpatient SLP services if deficits persist or worsen.   Plan of Care: Patient with no further skilled SLP needs in the acute care setting at this time  Post-Acute Therapy: Currently anticipate no further skilled therapy needs once patient is discharged from the inpatient setting.    See \"Rehab Therapy-Acute\" Patient Summary Report for complete documentation.   "

## 2018-08-05 NOTE — CARE PLAN
Problem: Safety  Goal: Will remain free from injury    Intervention: Provide assistance with mobility  Pt educated to call for assistance and helped her with mobility.

## 2018-08-05 NOTE — PROGRESS NOTES
A/o,assessment completed,poc discussed,verbalized understanding,able to tolerate dinner, family at bedside,elevated bp =204/88, will continue to monitor.

## 2018-08-05 NOTE — DISCHARGE PLANNING
Pt reports her family will pick her up before midnight tonight as they are in [place] at a wedding.  Pt was concerned she had to be out of the hospital at time dc was written.   Pt plans on returning to her PCP for follow up when she get home to [place].  Edmond RN at bedside, Dr. Saleem, and Adam Suárez NP aware of above.  No other needs verbalized/id'd by pt

## 2018-08-05 NOTE — PROGRESS NOTES
Dr. Servin notified on pt's elevated bp, permissive hypertension order clarified, new orders received, parameter for prn medication modified ,ok to administer for sbp > 170,  Pt can be discharge if sbp 170 or less.

## 2018-08-05 NOTE — PROGRESS NOTES
Pt for disharge,family able to pick her at 1145 pm,CM &MD aware,TANK Pagan talked to family and patient,Report given to Chata RN.

## 2018-08-05 NOTE — PROGRESS NOTES
Stroke education packet given ,pt requesting for pepto bismol  or imodium for  diarrhea  From the antibiotics, on call paged. Dr. Servin notified,new order received.

## 2018-08-05 NOTE — ASSESSMENT & PLAN NOTE
Expressive aphasia has resolved.  MRI negative for acute infarct  CTA head and neck negative for vessel disease  Echocardiogram negative for valvular or wall motion disease.  ECG is not acute  Continue aspirin and Plavix  Continue statin

## 2018-08-05 NOTE — DISCHARGE INSTRUCTIONS
"Discharge Instructions    Discharged to home by car with relative. Discharged via wheelchair, hospital escort: Yes.  Special equipment needed: Walker      Be sure to schedule a follow-up appointment with your primary care doctor or any specialists as instructed.     Discharge Plan:   Diet Plan: Discussed  Activity Level: Discussed  Confirmed Follow up Appointment: Patient to Call and Schedule Appointment  Confirmed Symptoms Management: Discussed  Medication Reconciliation Updated: Yes  Influenza Vaccine Indication: Patient Refuses    I understand that a diet low in cholesterol, fat, and sodium is recommended for good health. Unless I have been given specific instructions below for another diet, I accept this instruction as my diet prescription.   Other diet:     Special Instructions: None    · Is patient discharged on Warfarin / Coumadin?   No   Transient Ischemic Attack  A transient ischemic attack (TIA) is a \"warning stroke\" that causes stroke-like symptoms. A TIA does not cause lasting damage to the brain. The symptoms of a TIA can happen fast and do not last long. It is important to know the symptoms of a TIA and what to do. This can help prevent stroke or death.  Follow these instructions at home:  · Take medicines only as told by your doctor. Make sure you understand all of the instructions.  · You may need to take aspirin or warfarin medicine. Warfarin needs to be taken exactly as told.  ¨ Taking too much or too little warfarin is dangerous. Blood tests must be done as often as told by your doctor. A PT blood test measures how long it takes for blood to clot. Your PT is used to calculate another value called an INR. Your PT and INR help your doctor adjust your warfarin dosage. He or she will make sure you are taking the right amount.  ¨ Food can cause problems with warfarin and affect the results of your blood tests. This is true for foods high in vitamin K. Eat the same amount of foods high in vitamin K each " day. Foods high in vitamin K include spinach, kale, broccoli, cabbage, thalia and turnip greens, Mead sprouts, peas, cauliflower, seaweed, and parsley. Other foods high in vitamin K include beef and pork liver, green tea, and soybean oil. Eat the same amount of foods high in vitamin K each day. Avoid big changes in your diet. Tell your doctor before changing your diet. Talk to a  (dietitian) if you have questions.  ¨ Many medicines can cause problems with warfarin and affect your PT and INR. Tell your doctor about all medicines you take. This includes vitamins and dietary pills (supplements). Do not take or stop taking any prescribed or over-the-counter medicines unless your doctor tells you to.  ¨ Warfarin can cause more bruising or bleeding. Hold pressure over any cuts for longer than normal. Talk to your doctor about other side effects of warfarin.  ¨ Avoid sports or activities that may cause injury or bleeding.  ¨ Be careful when you shave, floss, or use sharp objects.  ¨ Avoid or drink very little alcohol while taking warfarin. Tell your doctor if you change how much alcohol you drink.  ¨ Tell your dentist and other doctors that you take warfarin before any procedures.  · Follow your diet program as told, if you are given one.  · Keep a healthy weight.  · Stay active. Try to get at least 30 minutes of activity on all or most days.  · Do not use any tobacco products, including cigarettes, chewing tobacco, or electronic cigarettes. If you need help quitting, ask your doctor.  · Limit alcohol intake to no more than 1 drink per day for nonpregnant women and 2 drinks per day for men. One drink equals 12 ounces of beer, 5 ounces of wine, or 1½ ounces of hard liquor.  · Do not abuse drugs.  · Keep your home safe so you do not fall. You can do this by:  ¨ Putting grab bars in the bedroom and bathroom.  ¨ Raising toilet seats.  ¨ Putting a seat in the shower.  · Keep all follow-up visits as told by  your doctor. This is important.  Contact a doctor if:  · Your personality changes.  · You have trouble swallowing.  · You have double vision.  · You are dizzy.  · You have a fever.  Get help right away if:  These symptoms may be an emergency. Do not wait to see if the symptoms will go away. Get medical help right away. Call your local emergency services (911 in the [place] Do not drive yourself to the hospital.  · You have sudden weakness or lose feeling (go numb), especially on one side of the body. This can affect your:  ¨ Face.  ¨ Arm.  ¨ Leg.  · You have sudden trouble walking.  · You have sudden trouble moving your arms or legs.  · You have sudden confusion.  · You have trouble talking.  · You have trouble understanding.  · You have sudden trouble seeing in one or both eyes.  · You lose your balance.  · Your movements are not smooth.  · You have a sudden, very bad headache with no known cause.  · You have new chest pain.  · Your heartbeat is unsteady.  · You are partly or totally unaware of what is going on around you.  This information is not intended to replace advice given to you by your health care provider. Make sure you discuss any questions you have with your health care provider.  Document Released: 09/26/2009 Document Revised: 08/21/2017 Document Reviewed: 03/25/2015  [employer] Interactive Patient Education © 2017 [employer]      Depression / Suicide Risk    As you are discharged from this [facility] facility, it is important to learn how to keep safe from harming yourself.    Recognize the warning signs:  · Abrupt changes in personality, positive or negative- including increase in energy   · Giving away possessions  · Change in eating patterns- significant weight changes-  positive or negative  · Change in sleeping patterns- unable to sleep or sleeping all the time   · Unwillingness or inability to communicate  · Depression  · Unusual sadness, discouragement and loneliness  · Talk of wanting to  die  · Neglect of personal appearance   · Rebelliousness- reckless behavior  · Withdrawal from people/activities they love  · Confusion- inability to concentrate     If you or a loved one observes any of these behaviors or has concerns about self-harm, here's what you can do:  · Talk about it- your feelings and reasons for harming yourself  · Remove any means that you might use to hurt yourself (examples: pills, rope, extension cords, firearm)  · Get professional help from the community (Mental Health, Substance Abuse, psychological counseling)  · Do not be alone:Call your Safe Contact- someone whom you trust who will be there for you.  · Call your local CRISIS HOTLINE 085-5699 or 844-108-6273  · Call your local Children's Mobile Crisis Response Team [place] (988) 550-5811 or www.[employer]  · Call the toll free National Suicide Prevention Hotlines   · National Suicide Prevention Lifeline 143-588-DUQN (0259)  · National Hope Line Network 800-SUICIDE (786-1260)

## 2018-08-05 NOTE — PROGRESS NOTES
Pt had lunch,neurostatus stable,pt states occational difficulty to bring right words,speech evaluated,PT,OT eval done.

## 2018-08-05 NOTE — PROGRESS NOTES
Pt's ride arrived, discharge instructions provided and discussed,understood, vss,iv accessed removed,wheeled down to car,belongings  Sent with, pt,accompanied by family.

## 2018-08-11 LAB — EKG IMPRESSION: NORMAL
